# Patient Record
Sex: MALE | URBAN - METROPOLITAN AREA
[De-identification: names, ages, dates, MRNs, and addresses within clinical notes are randomized per-mention and may not be internally consistent; named-entity substitution may affect disease eponyms.]

---

## 2021-02-23 ENCOUNTER — CLAIMS CREATED FROM THE CLAIM WINDOW (OUTPATIENT)
Dept: URBAN - METROPOLITAN AREA MEDICAL CENTER 43 | Facility: MEDICAL CENTER | Age: 86
End: 2021-02-23
Payer: MEDICARE

## 2021-02-23 DIAGNOSIS — R10.819 LOWER ABDOMINAL TENDERNESS: ICD-10-CM

## 2021-02-23 DIAGNOSIS — D50.8 ANEMIA, DUE TO INADEQUATE IRON INTAKE: ICD-10-CM

## 2021-02-23 DIAGNOSIS — R19.4 CHANGE IN BOWEL HABIT: ICD-10-CM

## 2021-02-23 DIAGNOSIS — I48.91 AFIB: ICD-10-CM

## 2021-02-23 PROCEDURE — 99223 1ST HOSP IP/OBS HIGH 75: CPT | Performed by: INTERNAL MEDICINE

## 2021-02-24 ENCOUNTER — CLAIMS CREATED FROM THE CLAIM WINDOW (OUTPATIENT)
Dept: URBAN - METROPOLITAN AREA MEDICAL CENTER 43 | Facility: MEDICAL CENTER | Age: 86
End: 2021-02-24
Payer: MEDICARE

## 2021-02-24 DIAGNOSIS — D50.8 OTHER IRON DEFICIENCY ANEMIAS: ICD-10-CM

## 2021-02-24 DIAGNOSIS — R19.4 CHANGE IN BOWEL HABIT: ICD-10-CM

## 2021-02-24 PROCEDURE — 99233 SBSQ HOSP IP/OBS HIGH 50: CPT | Performed by: INTERNAL MEDICINE

## 2021-02-25 ENCOUNTER — CLAIMS CREATED FROM THE CLAIM WINDOW (OUTPATIENT)
Dept: URBAN - METROPOLITAN AREA MEDICAL CENTER 43 | Facility: MEDICAL CENTER | Age: 86
End: 2021-02-25
Payer: MEDICARE

## 2021-02-25 DIAGNOSIS — R19.4 CHANGE IN BOWEL HABIT: ICD-10-CM

## 2021-02-25 DIAGNOSIS — D50.8 ANEMIA, DUE TO INADEQUATE IRON INTAKE: ICD-10-CM

## 2021-02-25 PROCEDURE — 99232 SBSQ HOSP IP/OBS MODERATE 35: CPT | Performed by: INTERNAL MEDICINE

## 2021-02-26 ENCOUNTER — CLAIMS CREATED FROM THE CLAIM WINDOW (OUTPATIENT)
Dept: URBAN - METROPOLITAN AREA MEDICAL CENTER 43 | Facility: MEDICAL CENTER | Age: 86
End: 2021-02-26
Payer: MEDICARE

## 2021-02-26 DIAGNOSIS — K55.20 ANGIODYSPLASIA OF COLON WITHOUT HEMORRHAGE: ICD-10-CM

## 2021-02-26 DIAGNOSIS — D50.9 IRON DEFICIENCY ANEMIA, UNSPECIFIED: ICD-10-CM

## 2021-02-26 DIAGNOSIS — D12.2 BENIGN NEOPLASM OF ASCENDING COLON: ICD-10-CM

## 2021-02-26 DIAGNOSIS — D12.0 BENIGN NEOPLASM OF CECUM: ICD-10-CM

## 2021-02-26 DIAGNOSIS — R19.4 CHANGE IN BOWEL HABIT: ICD-10-CM

## 2021-02-26 DIAGNOSIS — K22.8 OTHER SPECIFIED DISEASES OF ESOPHAGUS: ICD-10-CM

## 2021-02-26 DIAGNOSIS — K29.50 ANTRAL GASTRITIS: ICD-10-CM

## 2021-02-26 DIAGNOSIS — K31.89 DILATION OF STOMACH: ICD-10-CM

## 2021-02-26 PROCEDURE — 45385 COLONOSCOPY W/LESION REMOVAL: CPT | Performed by: INTERNAL MEDICINE

## 2021-02-26 PROCEDURE — 43239 EGD BIOPSY SINGLE/MULTIPLE: CPT | Performed by: INTERNAL MEDICINE

## 2021-02-26 PROCEDURE — 45388 COLONOSCOPY W/ABLATION: CPT | Performed by: INTERNAL MEDICINE

## 2021-02-27 ENCOUNTER — CLAIMS CREATED FROM THE CLAIM WINDOW (OUTPATIENT)
Dept: URBAN - METROPOLITAN AREA MEDICAL CENTER 43 | Facility: MEDICAL CENTER | Age: 86
End: 2021-02-27
Payer: MEDICARE

## 2021-02-27 DIAGNOSIS — D12.0 BENIGN NEOPLASM OF CECUM: ICD-10-CM

## 2021-02-27 DIAGNOSIS — D12.2 BENIGN NEOPLASM OF ASCENDING COLON: ICD-10-CM

## 2021-02-27 DIAGNOSIS — D50.8 ANEMIA, DUE TO INADEQUATE IRON INTAKE: ICD-10-CM

## 2021-02-27 DIAGNOSIS — R19.4 CHANGE IN BOWEL HABIT: ICD-10-CM

## 2021-02-27 PROCEDURE — 99232 SBSQ HOSP IP/OBS MODERATE 35: CPT | Performed by: INTERNAL MEDICINE

## 2021-03-25 ENCOUNTER — OFFICE VISIT (OUTPATIENT)
Dept: URBAN - METROPOLITAN AREA CLINIC 113 | Facility: CLINIC | Age: 86
End: 2021-03-25

## 2021-04-23 ENCOUNTER — OFFICE VISIT (OUTPATIENT)
Dept: URBAN - METROPOLITAN AREA CLINIC 113 | Facility: CLINIC | Age: 86
End: 2021-04-23
Payer: MEDICARE

## 2021-04-23 ENCOUNTER — WEB ENCOUNTER (OUTPATIENT)
Dept: URBAN - METROPOLITAN AREA CLINIC 113 | Facility: CLINIC | Age: 86
End: 2021-04-23

## 2021-04-23 VITALS
HEIGHT: 74 IN | TEMPERATURE: 97.8 F | BODY MASS INDEX: 26.56 KG/M2 | HEART RATE: 54 BPM | WEIGHT: 207 LBS | SYSTOLIC BLOOD PRESSURE: 133 MMHG | DIASTOLIC BLOOD PRESSURE: 61 MMHG

## 2021-04-23 DIAGNOSIS — R14.3 EXCESSIVE GAS: ICD-10-CM

## 2021-04-23 DIAGNOSIS — K55.20 ARTERIOVENOUS MALFORMATION OF COLON: ICD-10-CM

## 2021-04-23 DIAGNOSIS — D50.9 IRON DEFICIENCY ANEMIA: ICD-10-CM

## 2021-04-23 PROBLEM — 87522002 IRON DEFICIENCY ANEMIA: Status: ACTIVE | Noted: 2021-04-23

## 2021-04-23 PROCEDURE — 99213 OFFICE O/P EST LOW 20 MIN: CPT | Performed by: NURSE PRACTITIONER

## 2021-04-23 RX ORDER — FUROSEMIDE 40 MG/1
1 TABLET PRN TABLET ORAL
Status: ACTIVE | COMMUNITY

## 2021-04-23 RX ORDER — TEMAZEPAM 30 MG/1
1 CAPSULE AT BEDTIME AS NEEDED CAPSULE ORAL ONCE A DAY
Status: ACTIVE | COMMUNITY

## 2021-04-23 RX ORDER — LOSARTAN POTASSIUM 25 MG/1
1 TABLET TABLET ORAL ONCE A DAY
Status: ACTIVE | COMMUNITY

## 2021-04-23 RX ORDER — SODIUM BICARBONATE TAB 650 MG 650 MG
1 TABLET TAB ORAL ONCE DAILY
Status: ACTIVE | COMMUNITY

## 2021-04-23 RX ORDER — HYDRALAZINE HYDROCHLORIDE 50 MG/1
1 TABLET TABLET, FILM COATED ORAL THREE TIMES A DAY
Status: ACTIVE | COMMUNITY

## 2021-04-23 RX ORDER — APIXABAN 2.5 MG/1
1 TABLET TABLET, FILM COATED ORAL TWICE DAILY
Status: ACTIVE | COMMUNITY

## 2021-04-23 RX ORDER — ROSUVASTATIN CALCIUM 10 MG/1
1 TABLET TABLET, FILM COATED ORAL ONCE A DAY
Status: ACTIVE | COMMUNITY

## 2021-04-23 RX ORDER — TAMSULOSIN HYDROCHLORIDE 0.4 MG/1
1 CAPSULE CAPSULE ORAL ONCE A DAY
Status: ACTIVE | COMMUNITY

## 2021-04-23 RX ORDER — ROPINIROLE 2 MG/1
1 TABLET TABLET, FILM COATED ORAL ONCE A DAY
Status: ACTIVE | COMMUNITY

## 2021-04-23 NOTE — HPI-TODAY'S VISIT:
88-year-old gentleman with history of atrial fibrillation on Eliquis, ischemic cardiomyopathy, congestive heart failure, atrial fibrillation on Eliquis, prior CABG, chronic kidney disease, presenting for follow-up after hospitalization. He was seen in hospital consultation on 2/22/2021 by Dr. Rosario for evaluation of symptomatic anemia iron deficiency, following presentation for fatigue and weakness with an admission hemoglobin of 6.5.  He was transfused with 2 units of packed red blood cells and placed on pantoprazole.  An EGD and colonoscopy were recommended, but the patient initially deferred endoscopic assessment.  Regarding his diarrhea predominant change in bowel habits, he was recommended a trial of WelChol and a lactose-free diet. Subsequent colonoscopy on 2/26/2021 was notable for a single nonbleeding 1 cm cecal angiectasia treated with argon plasma coagulation (APC) which was the suspected source of occult GI bleeding.  Also notable for a single nonbleeding colonic angiectasia status post APC, diverticulosis in the sigmoid colon, four 4 to 5 mm polyps in the ascending colon and cecum, significant colonic spasm, and grade 1 internal hemorrhoids.  Polypectomy pathology showed tubular adenomas. EGD on 2/26/2021 was notable for an irregular Z-line and nonerosive gastropathy.  Gastric biopsy showed mild chronic gastritis, negative for H. pylori.  Duodenal biopsies were negative for sprue.  His fatigue and weakness have resolved.  He denies abdominal pain, nausea, or vomiting.  He has a daily bowel movement without blood per rectum.  He denies difficulty with diarrhea, and never began the WelChol.  Within the last 4 to 6 months, he has experienced increasing difficulty with excessive gas and occasional abdominal bloating, particularly following meals.  At times, he will experience the urge to have a bowel movement and will just pass flatus.  He reduces consul from 1 tablespoon 3 times daily to twice daily, with minimal improvement in his gas.  He has been taking his fiber supplementation for about 3 years. He reports recent labs at [facility] showed normal blood counts and iron studies. He is scheduled to undergo Watchman procedure next month. His son accompanies him and assists with the history.

## 2021-08-04 ENCOUNTER — OFFICE VISIT (OUTPATIENT)
Dept: URBAN - METROPOLITAN AREA CLINIC 113 | Facility: CLINIC | Age: 86
End: 2021-08-04
Payer: MEDICARE

## 2021-08-04 ENCOUNTER — WEB ENCOUNTER (OUTPATIENT)
Dept: URBAN - METROPOLITAN AREA CLINIC 113 | Facility: CLINIC | Age: 86
End: 2021-08-04

## 2021-08-04 VITALS
WEIGHT: 214 LBS | SYSTOLIC BLOOD PRESSURE: 119 MMHG | DIASTOLIC BLOOD PRESSURE: 56 MMHG | TEMPERATURE: 97.8 F | HEIGHT: 74 IN | HEART RATE: 52 BPM | BODY MASS INDEX: 27.46 KG/M2

## 2021-08-04 DIAGNOSIS — R14.3 EXCESSIVE GAS: ICD-10-CM

## 2021-08-04 DIAGNOSIS — K21.9 GASTROESOPHAGEAL REFLUX DISEASE, UNSPECIFIED WHETHER ESOPHAGITIS PRESENT: ICD-10-CM

## 2021-08-04 DIAGNOSIS — K59.01 SLOW TRANSIT CONSTIPATION: ICD-10-CM

## 2021-08-04 PROCEDURE — 99213 OFFICE O/P EST LOW 20 MIN: CPT | Performed by: INTERNAL MEDICINE

## 2021-08-04 RX ORDER — TEMAZEPAM 30 MG/1
1 CAPSULE AT BEDTIME AS NEEDED CAPSULE ORAL ONCE A DAY
Status: ACTIVE | COMMUNITY

## 2021-08-04 RX ORDER — CLOPIDOGREL BISULFATE 75 MG/1
1 TABLET TABLET ORAL ONCE A DAY
Status: ACTIVE | COMMUNITY

## 2021-08-04 RX ORDER — ROPINIROLE 2 MG/1
1 TABLET TABLET, FILM COATED ORAL ONCE A DAY
Status: ACTIVE | COMMUNITY

## 2021-08-04 RX ORDER — ASPIRIN 81 MG/1
1 TABLET TABLET ORAL ONCE A DAY
Status: ACTIVE | COMMUNITY

## 2021-08-04 RX ORDER — TAMSULOSIN HYDROCHLORIDE 0.4 MG/1
1 CAPSULE CAPSULE ORAL ONCE A DAY
Status: ACTIVE | COMMUNITY

## 2021-08-04 RX ORDER — ROSUVASTATIN CALCIUM 10 MG/1
1 TABLET TABLET, FILM COATED ORAL ONCE A DAY
Status: ACTIVE | COMMUNITY

## 2021-08-04 RX ORDER — LOSARTAN POTASSIUM 25 MG/1
1 TABLET TABLET ORAL ONCE A DAY
Status: ACTIVE | COMMUNITY

## 2021-08-04 RX ORDER — APIXABAN 2.5 MG/1
1 TABLET TABLET, FILM COATED ORAL TWICE DAILY
Status: ON HOLD | COMMUNITY

## 2021-08-04 RX ORDER — HYDRALAZINE HYDROCHLORIDE 50 MG/1
1 TABLET TABLET, FILM COATED ORAL THREE TIMES A DAY
Status: ACTIVE | COMMUNITY

## 2021-08-04 RX ORDER — FUROSEMIDE 40 MG/1
1 TABLET PRN TABLET ORAL
Status: ACTIVE | COMMUNITY

## 2021-08-04 RX ORDER — SODIUM BICARBONATE TAB 650 MG 650 MG
1 TABLET TAB ORAL ONCE DAILY
Status: ACTIVE | COMMUNITY

## 2021-08-04 NOTE — HPI-TODAY'S VISIT:
Mr. Parker is a 89-year-old gentleman with history of atrial fibrillation on Eliquis, ischemic cardiomyopathy, congestive heart failure, atrial fibrillation on Eliquis, prior CABG, chronic kidney disease, presenting for follow-up regarding constipation and GERD. He was last seen on 4/23/2021 for follow-up after hospitalization for iron deficiency anemia status post EGD and colonoscopy notable for ablation of colon AVMs.  He also reported having excessive gas.  He was recommended to trial a low FODMAP diet, use simethicone, and continue consult and add MiraLAX for suspected constipation. His most recent labs from 5/6/2021 show sodium 129, potassium 3.9, chloride 95, CO2 25.1, BUN 35, creatinine 1.3, glucose 87, calcium 8.9; WBC 6.79, hemoglobin 11.1, MCV 82.3, platelets 128, INR 1.2, PT 15.6. He reports for the past 6 months having issues of increased gas/bloating, inconsistency in his bowel habits with looser bowel movements exacerbated by some meals and periods of no bowel movement.  He describes having "blowout gas".  No melena or red blood per rectum.  Is not have any significant abdominal pain, no nocturnal bowel movements and denies any fevers or weight loss.  His heartburn symptoms are well controlled on famotidine 40 mg daily.  No dysphagia.

## 2021-08-04 NOTE — HPI-OTHER HISTORIES
Colonoscopy (2/26/2021) single nonbleeding 1 cm cecal angiectasia treated with argon plasma coagulation (APC) which was the suspected source of occult GI bleeding.  Also notable for a single nonbleeding colonic angiectasia status post APC, diverticulosis in the sigmoid colon, removal of four 4 to 5 mm tubular adenomas in the ascending colon and cecum, significant colonic spasm, and grade 1 internal hemorrhoids.    EGD on 2/26/2021 was notable for an irregular Z-line and nonerosive gastropathy.  Gastric biopsy showed mild chronic gastritis, negative for H. pylori.  Duodenal biopsies were negative for sprue.

## 2021-08-16 ENCOUNTER — TELEPHONE ENCOUNTER (OUTPATIENT)
Dept: URBAN - METROPOLITAN AREA CLINIC 113 | Facility: CLINIC | Age: 86
End: 2021-08-16

## 2021-08-16 RX ORDER — LINACLOTIDE 145 UG/1
1 CAPSULE AT LEAST 30 MINUTES BEFORE THE FIRST MEAL OF THE DAY ON AN EMPTY STOMACH CAPSULE, GELATIN COATED ORAL ONCE A DAY
Qty: 90 | Refills: 3 | OUTPATIENT

## 2021-08-16 RX ORDER — SODIUM BICARBONATE TAB 650 MG 650 MG
1 TABLET TAB ORAL ONCE DAILY
Status: ACTIVE | COMMUNITY

## 2021-08-16 RX ORDER — APIXABAN 2.5 MG/1
1 TABLET TABLET, FILM COATED ORAL TWICE DAILY
Status: ON HOLD | COMMUNITY

## 2021-08-16 RX ORDER — ASPIRIN 81 MG/1
1 TABLET TABLET ORAL ONCE A DAY
Status: ACTIVE | COMMUNITY

## 2021-08-16 RX ORDER — ROSUVASTATIN CALCIUM 10 MG/1
1 TABLET TABLET, FILM COATED ORAL ONCE A DAY
Status: ACTIVE | COMMUNITY

## 2021-08-16 RX ORDER — TEMAZEPAM 30 MG/1
1 CAPSULE AT BEDTIME AS NEEDED CAPSULE ORAL ONCE A DAY
Status: ACTIVE | COMMUNITY

## 2021-08-16 RX ORDER — HYDRALAZINE HYDROCHLORIDE 50 MG/1
1 TABLET TABLET, FILM COATED ORAL THREE TIMES A DAY
Status: ACTIVE | COMMUNITY

## 2021-08-16 RX ORDER — FUROSEMIDE 40 MG/1
1 TABLET PRN TABLET ORAL
Status: ACTIVE | COMMUNITY

## 2021-08-16 RX ORDER — LOSARTAN POTASSIUM 25 MG/1
1 TABLET TABLET ORAL ONCE A DAY
Status: ACTIVE | COMMUNITY

## 2021-08-16 RX ORDER — ROPINIROLE 2 MG/1
1 TABLET TABLET, FILM COATED ORAL ONCE A DAY
Status: ACTIVE | COMMUNITY

## 2021-08-16 RX ORDER — TAMSULOSIN HYDROCHLORIDE 0.4 MG/1
1 CAPSULE CAPSULE ORAL ONCE A DAY
Status: ACTIVE | COMMUNITY

## 2021-08-16 RX ORDER — CLOPIDOGREL BISULFATE 75 MG/1
1 TABLET TABLET ORAL ONCE A DAY
Status: ACTIVE | COMMUNITY

## 2022-01-20 ENCOUNTER — TELEPHONE ENCOUNTER (OUTPATIENT)
Dept: URBAN - METROPOLITAN AREA CLINIC 113 | Facility: CLINIC | Age: 87
End: 2022-01-20

## 2022-01-20 RX ORDER — LINACLOTIDE 145 UG/1
1 CAPSULE AT LEAST 30 MINUTES BEFORE THE FIRST MEAL OF THE DAY ON AN EMPTY STOMACH CAPSULE, GELATIN COATED ORAL ONCE A DAY
Qty: 90 | Refills: 3
End: 2023-01-15

## 2022-04-13 ENCOUNTER — OFFICE VISIT (OUTPATIENT)
Dept: URBAN - METROPOLITAN AREA CLINIC 113 | Facility: CLINIC | Age: 87
End: 2022-04-13
Payer: MEDICARE

## 2022-04-13 VITALS
TEMPERATURE: 97.3 F | RESPIRATION RATE: 20 BRPM | SYSTOLIC BLOOD PRESSURE: 121 MMHG | BODY MASS INDEX: 26.31 KG/M2 | HEIGHT: 74 IN | HEART RATE: 62 BPM | WEIGHT: 205 LBS | DIASTOLIC BLOOD PRESSURE: 65 MMHG

## 2022-04-13 DIAGNOSIS — R14.3 EXCESSIVE GAS: ICD-10-CM

## 2022-04-13 DIAGNOSIS — K59.01 SLOW TRANSIT CONSTIPATION: ICD-10-CM

## 2022-04-13 PROCEDURE — 99213 OFFICE O/P EST LOW 20 MIN: CPT | Performed by: INTERNAL MEDICINE

## 2022-04-13 RX ORDER — TEMAZEPAM 30 MG/1
1 CAPSULE AT BEDTIME AS NEEDED CAPSULE ORAL ONCE A DAY
Status: ACTIVE | COMMUNITY

## 2022-04-13 RX ORDER — HYDRALAZINE HYDROCHLORIDE 50 MG/1
1 TABLET TABLET, FILM COATED ORAL THREE TIMES A DAY
Status: ACTIVE | COMMUNITY

## 2022-04-13 RX ORDER — APIXABAN 2.5 MG/1
1 TABLET TABLET, FILM COATED ORAL TWICE DAILY
Status: ON HOLD | COMMUNITY

## 2022-04-13 RX ORDER — LINACLOTIDE 72 UG/1
1 CAPSULE AT LEAST 30 MINUTES BEFORE THE FIRST MEAL OF THE DAY ON AN EMPTY STOMACH CAPSULE, GELATIN COATED ORAL ONCE A DAY
OUTPATIENT

## 2022-04-13 RX ORDER — UBIDECARENONE 30 MG
AS DIRECTED CAPSULE ORAL
Status: ACTIVE | COMMUNITY

## 2022-04-13 RX ORDER — ROSUVASTATIN CALCIUM 10 MG/1
1 TABLET TABLET, FILM COATED ORAL ONCE A DAY
Status: ACTIVE | COMMUNITY

## 2022-04-13 RX ORDER — NICOTINE 14MG/24HR
1 CAPSULE PATCH, TRANSDERMAL 24 HOURS TRANSDERMAL TWICE A DAY
OUTPATIENT

## 2022-04-13 RX ORDER — CLOPIDOGREL BISULFATE 75 MG/1
1 TABLET TABLET ORAL ONCE A DAY
Status: ON HOLD | COMMUNITY

## 2022-04-13 RX ORDER — FERROUS SULFATE 324(65)MG
1 TABLET TABLET, DELAYED RELEASE (ENTERIC COATED) ORAL ONCE A DAY
Status: ACTIVE | COMMUNITY

## 2022-04-13 RX ORDER — ASPIRIN 81 MG/1
1 TABLET TABLET ORAL ONCE A DAY
Status: ACTIVE | COMMUNITY

## 2022-04-13 RX ORDER — NICOTINE 14MG/24HR
1 CAPSULE PATCH, TRANSDERMAL 24 HOURS TRANSDERMAL TWICE A DAY
Status: ACTIVE | COMMUNITY

## 2022-04-13 RX ORDER — TAMSULOSIN HYDROCHLORIDE 0.4 MG/1
1 CAPSULE CAPSULE ORAL ONCE A DAY
Status: ACTIVE | COMMUNITY

## 2022-04-13 RX ORDER — FUROSEMIDE 40 MG/1
1 TABLET PRN TABLET ORAL
Status: ACTIVE | COMMUNITY

## 2022-04-13 RX ORDER — LOSARTAN POTASSIUM 25 MG/1
1 TABLET TABLET ORAL ONCE A DAY
Status: ACTIVE | COMMUNITY

## 2022-04-13 RX ORDER — LINACLOTIDE 145 UG/1
1 CAPSULE AT LEAST 30 MINUTES BEFORE THE FIRST MEAL OF THE DAY ON AN EMPTY STOMACH CAPSULE, GELATIN COATED ORAL ONCE A DAY
Qty: 90 | Refills: 3 | Status: ACTIVE | COMMUNITY
End: 2023-01-15

## 2022-04-13 RX ORDER — ROPINIROLE 2 MG/1
1 TABLET TABLET, FILM COATED ORAL ONCE A DAY
Status: ACTIVE | COMMUNITY

## 2022-04-13 RX ORDER — SODIUM BICARBONATE TAB 650 MG 650 MG
1 TABLET TAB ORAL ONCE DAILY
Status: ACTIVE | COMMUNITY

## 2022-04-13 NOTE — HPI-TODAY'S VISIT:
Mr. Parker is a 89-year-old gentleman with history of atrial fibrillation on Eliquis, ischemic cardiomyopathy, congestive heart failure, atrial fibrillation on Eliquis, prior CABG, chronic kidney disease, presenting for follow-up regarding constipation and GERD. He was last seen on 4/23/2021 for follow-up after hospitalization for iron deficiency anemia status post EGD and colonoscopy notable for ablation of colon AVMs.  He also reported having excessive gas.  He was recommended to trial a low FODMAP diet, use simethicone, and continue consult and add MiraLAX for suspected constipation. His most recent labs from 5/6/2021 show sodium 129, potassium 3.9, chloride 95, CO2 25.1, BUN 35, creatinine 1.3, glucose 87, calcium 8.9; WBC 6.79, hemoglobin 11.1, MCV 82.3, platelets 128, INR 1.2, PT 15.6. He reports for the past 6 months having issues of increased gas/bloating, inconsistency in his bowel habits with looser bowel movements exacerbated by some meals and periods of no bowel movement.  He describes having "blowout gas".  No melena or red blood per rectum.  Is not have any significant abdominal pain, no nocturnal bowel movements and denies any fevers or weight loss.  His heartburn symptoms are well controlled on famotidine 40 mg daily.  No dysphagia. He was last seen on 8/4/2021 for follow-up regarding slow transit constipation, excessive gas and GERD.  Is recommended to trial Linzess 145 mcg daily, use simethicone/Gas-X for relief of exacerbations of gas and bloating, and resume famotidine 20 mg daily for relief of GERD symptoms.  Her follow-up telephone call on 8/16/2021, he reported the Linzess 145 mcg daily was helpful.  A prescription was provided. Labs on 1/13/22 show WBC 5.91, hemoglobin 11.1, MCV 90.6, platelets 178, normal basic metabolic panel. Since his last appointment he developed a staph infection in his right knee and has been on antibiotics for 2 months.  He has been on the antibiotics he has developed a change in his bowel habits described as watery and now slowly becoming more formed.  He continues to take Linzess 145 mcg daily, a probiotic daily and Konsyl fiber daily.  He denies any abdominal pain or red blood per rectum.

## 2022-04-13 NOTE — HPI-OTHER HISTORIES
Colonoscopy (2/26/2021) single nonbleeding 1 cm cecal angiectasia treated with argon plasma coagulation (APC) which was the suspected source of occult GI bleeding, a single nonbleeding ascending colon angiectasia status post APC, diverticulosis in the sigmoid colon, removal of four 4 to 5 mm tubular adenomas in the ascending colon and cecum, significant colonic spasm, and grade 1 internal hemorrhoids.    EGD (2/26/2021): notable for an irregular Z-line and nonerosive gastropathy.  Gastric biopsy showed mild chronic gastritis, negative for H. pylori.  Duodenal biopsies were negative for sprue.

## 2022-04-28 ENCOUNTER — TELEPHONE ENCOUNTER (OUTPATIENT)
Dept: URBAN - METROPOLITAN AREA CLINIC 113 | Facility: CLINIC | Age: 87
End: 2022-04-28

## 2022-04-28 PROBLEM — 80301007: Status: ACTIVE | Noted: 2021-08-04

## 2022-05-02 ENCOUNTER — TELEPHONE ENCOUNTER (OUTPATIENT)
Dept: URBAN - METROPOLITAN AREA CLINIC 113 | Facility: CLINIC | Age: 87
End: 2022-05-02

## 2022-05-02 RX ORDER — LINACLOTIDE 72 UG/1
1 CAPSULE AT LEAST 30 MINUTES BEFORE THE FIRST MEAL OF THE DAY ON AN EMPTY STOMACH CAPSULE, GELATIN COATED ORAL ONCE A DAY
Qty: 30 | Refills: 4

## 2022-09-09 ENCOUNTER — OFFICE VISIT (OUTPATIENT)
Dept: URBAN - METROPOLITAN AREA CLINIC 113 | Facility: CLINIC | Age: 87
End: 2022-09-09
Payer: MEDICARE

## 2022-09-09 VITALS
TEMPERATURE: 97.5 F | HEART RATE: 53 BPM | DIASTOLIC BLOOD PRESSURE: 62 MMHG | SYSTOLIC BLOOD PRESSURE: 118 MMHG | WEIGHT: 205 LBS | HEIGHT: 74 IN | BODY MASS INDEX: 26.31 KG/M2

## 2022-09-09 DIAGNOSIS — K21.9 GERD: ICD-10-CM

## 2022-09-09 DIAGNOSIS — K59.01 SLOW TRANSIT CONSTIPATION: ICD-10-CM

## 2022-09-09 PROCEDURE — 99214 OFFICE O/P EST MOD 30 MIN: CPT | Performed by: NURSE PRACTITIONER

## 2022-09-09 RX ORDER — FERROUS SULFATE 324(65)MG
1 TABLET TABLET, DELAYED RELEASE (ENTERIC COATED) ORAL ONCE A DAY
Status: ACTIVE | COMMUNITY

## 2022-09-09 RX ORDER — ASPIRIN 81 MG/1
1 TABLET TABLET ORAL ONCE A DAY
Status: ACTIVE | COMMUNITY

## 2022-09-09 RX ORDER — FAMOTIDINE 40 MG/1
1 TABLET AT BEDTIME TABLET, FILM COATED ORAL ONCE A DAY
Status: ACTIVE | COMMUNITY

## 2022-09-09 RX ORDER — TAMSULOSIN HYDROCHLORIDE 0.4 MG/1
1 CAPSULE CAPSULE ORAL ONCE A DAY
Status: ACTIVE | COMMUNITY

## 2022-09-09 RX ORDER — HYDRALAZINE HYDROCHLORIDE 50 MG/1
1 TABLET TABLET, FILM COATED ORAL THREE TIMES A DAY
Status: ACTIVE | COMMUNITY

## 2022-09-09 RX ORDER — NICOTINE 14MG/24HR
1 CAPSULE PATCH, TRANSDERMAL 24 HOURS TRANSDERMAL TWICE A DAY
Status: ON HOLD | COMMUNITY

## 2022-09-09 RX ORDER — UBIDECARENONE 30 MG
AS DIRECTED CAPSULE ORAL
Status: ACTIVE | COMMUNITY

## 2022-09-09 RX ORDER — MIRTAZAPINE 30 MG/1
1 TABLET ON THE TONGUE AND ALLOW TO DISSOLVE AT BEDTIME TABLET, ORALLY DISINTEGRATING ORAL ONCE A DAY
Status: ACTIVE | COMMUNITY

## 2022-09-09 RX ORDER — FUROSEMIDE 40 MG/1
1 TABLET PRN TABLET ORAL
Status: ACTIVE | COMMUNITY

## 2022-09-09 RX ORDER — DEXLANSOPRAZOLE 60 MG/1
1 CAPSULE CAPSULE, DELAYED RELEASE ORAL ONCE A DAY
Status: ACTIVE | COMMUNITY

## 2022-09-09 RX ORDER — LINACLOTIDE 72 UG/1
1 CAPSULE AT LEAST 30 MINUTES BEFORE THE FIRST MEAL OF THE DAY ON AN EMPTY STOMACH CAPSULE, GELATIN COATED ORAL ONCE A DAY
Qty: 30 | Refills: 4 | Status: ACTIVE | COMMUNITY

## 2022-09-09 RX ORDER — TEMAZEPAM 30 MG/1
1 CAPSULE AT BEDTIME AS NEEDED CAPSULE ORAL ONCE A DAY
Status: ACTIVE | COMMUNITY

## 2022-09-09 RX ORDER — LOSARTAN POTASSIUM 25 MG/1
1 TABLET TABLET ORAL ONCE A DAY
Status: ACTIVE | COMMUNITY

## 2022-09-09 RX ORDER — APIXABAN 2.5 MG/1
1 TABLET TABLET, FILM COATED ORAL TWICE DAILY
Status: ON HOLD | COMMUNITY

## 2022-09-09 RX ORDER — ROSUVASTATIN CALCIUM 10 MG/1
1 TABLET TABLET, FILM COATED ORAL ONCE A DAY
Status: ACTIVE | COMMUNITY

## 2022-09-09 RX ORDER — PLECANATIDE 3 MG/1
1 TABLET TABLET ORAL ONCE A DAY
Qty: 90 TABLET | Refills: 3 | OUTPATIENT
Start: 2022-09-09 | End: 2023-09-04

## 2022-09-09 RX ORDER — SODIUM BICARBONATE TAB 650 MG 650 MG
1 TABLET TAB ORAL ONCE DAILY
Status: ACTIVE | COMMUNITY

## 2022-09-09 RX ORDER — CLOPIDOGREL BISULFATE 75 MG/1
1 TABLET TABLET ORAL ONCE A DAY
Status: ON HOLD | COMMUNITY

## 2022-09-09 RX ORDER — ROPINIROLE 2 MG/1
1 TABLET TABLET, FILM COATED ORAL ONCE A DAY
Status: ACTIVE | COMMUNITY

## 2022-09-09 NOTE — HPI-TODAY'S VISIT:
Mr. Parker is a 89-year-old gentleman with history of atrial fibrillation on Eliquis, ischemic cardiomyopathy, congestive heart failure, atrial fibrillation on Eliquis, prior CABG, chronic kidney disease, presenting for follow-up. He was seen in the office 4/13/2022 for follow-up regarding a change in bowel habits status post prolonged antibiotics for treatment of a septic right knee.  He was to decrease Linzess to 72 mcg daily and continue Konsyl fiber.  His excess gas had responded to a probiotic. He has continued to experience inconsistent response to Linzess. He will go 1-2 days without a bowel movement, prompting use of milk of magnesia, or will experience several episodes of diarrhea. He denies abdominal pain, nausea or blood in the stool. He remains on Metamucil 1 tsp daily. Within the last month, he has experienced a significant bout of reflux symptoms (heartburn, indigestion, nocturnal cough and gagging), which seemed to occur after being off of his famotidine. He was started back on famotidine by Dr. Ortiz and initiated on Dexilant by Dr. Cheatham. His symptoms have resolved within the last week since being on acid suppression.

## 2022-09-12 ENCOUNTER — TELEPHONE ENCOUNTER (OUTPATIENT)
Dept: URBAN - METROPOLITAN AREA CLINIC 113 | Facility: CLINIC | Age: 87
End: 2022-09-12

## 2022-09-20 ENCOUNTER — TELEPHONE ENCOUNTER (OUTPATIENT)
Dept: URBAN - METROPOLITAN AREA CLINIC 113 | Facility: CLINIC | Age: 87
End: 2022-09-20

## 2022-09-23 ENCOUNTER — TELEPHONE ENCOUNTER (OUTPATIENT)
Dept: URBAN - METROPOLITAN AREA CLINIC 113 | Facility: CLINIC | Age: 87
End: 2022-09-23

## 2022-09-29 ENCOUNTER — TELEPHONE ENCOUNTER (OUTPATIENT)
Dept: URBAN - METROPOLITAN AREA CLINIC 113 | Facility: CLINIC | Age: 87
End: 2022-09-29

## 2022-09-29 RX ORDER — PLECANATIDE 3 MG/1
1 TABLET TABLET ORAL ONCE A DAY
Qty: 90 | Refills: 3
Start: 2022-09-09 | End: 2023-09-24

## 2022-10-11 ENCOUNTER — TELEPHONE ENCOUNTER (OUTPATIENT)
Dept: URBAN - METROPOLITAN AREA CLINIC 113 | Facility: CLINIC | Age: 87
End: 2022-10-11

## 2022-10-11 RX ORDER — NICOTINE 14MG/24HR
1 CAPSULE PATCH, TRANSDERMAL 24 HOURS TRANSDERMAL TWICE A DAY
Status: ON HOLD | COMMUNITY

## 2022-10-11 RX ORDER — LOSARTAN POTASSIUM 25 MG/1
1 TABLET TABLET ORAL ONCE A DAY
Status: ACTIVE | COMMUNITY

## 2022-10-11 RX ORDER — FERROUS SULFATE 324(65)MG
1 TABLET TABLET, DELAYED RELEASE (ENTERIC COATED) ORAL ONCE A DAY
Status: ACTIVE | COMMUNITY

## 2022-10-11 RX ORDER — UBIDECARENONE 30 MG
AS DIRECTED CAPSULE ORAL
Status: ACTIVE | COMMUNITY

## 2022-10-11 RX ORDER — POLYETHYLENE GLYCOL 3350, SODIUM CHLORIDE, SODIUM BICARBONATE, POTASSIUM CHLORIDE 420; 11.2; 5.72; 1.48 G/4L; G/4L; G/4L; G/4L
AS DIRECTED POWDER, FOR SOLUTION ORAL
Qty: 4000 ML | Refills: 0 | OUTPATIENT
Start: 2022-10-13 | End: 2022-11-11

## 2022-10-11 RX ORDER — ROPINIROLE 2 MG/1
1 TABLET TABLET, FILM COATED ORAL ONCE A DAY
Status: ACTIVE | COMMUNITY

## 2022-10-11 RX ORDER — FAMOTIDINE 40 MG/1
1 TABLET AT BEDTIME TABLET, FILM COATED ORAL ONCE A DAY
Status: ACTIVE | COMMUNITY

## 2022-10-11 RX ORDER — APIXABAN 2.5 MG/1
1 TABLET TABLET, FILM COATED ORAL TWICE DAILY
Status: ON HOLD | COMMUNITY

## 2022-10-11 RX ORDER — ASPIRIN 81 MG/1
1 TABLET TABLET ORAL ONCE A DAY
Status: ACTIVE | COMMUNITY

## 2022-10-11 RX ORDER — TAMSULOSIN HYDROCHLORIDE 0.4 MG/1
1 CAPSULE CAPSULE ORAL ONCE A DAY
Status: ACTIVE | COMMUNITY

## 2022-10-11 RX ORDER — FUROSEMIDE 40 MG/1
1 TABLET PRN TABLET ORAL
Status: ACTIVE | COMMUNITY

## 2022-10-11 RX ORDER — PLECANATIDE 3 MG/1
1 TABLET TABLET ORAL ONCE A DAY
Qty: 90 | Refills: 3 | Status: ACTIVE | COMMUNITY
Start: 2022-09-09 | End: 2023-09-24

## 2022-10-11 RX ORDER — SODIUM BICARBONATE TAB 650 MG 650 MG
1 TABLET TAB ORAL ONCE DAILY
Status: ACTIVE | COMMUNITY

## 2022-10-11 RX ORDER — MIRTAZAPINE 30 MG/1
1 TABLET ON THE TONGUE AND ALLOW TO DISSOLVE AT BEDTIME TABLET, ORALLY DISINTEGRATING ORAL ONCE A DAY
Status: ACTIVE | COMMUNITY

## 2022-10-11 RX ORDER — HYDRALAZINE HYDROCHLORIDE 50 MG/1
1 TABLET TABLET, FILM COATED ORAL THREE TIMES A DAY
Status: ACTIVE | COMMUNITY

## 2022-10-11 RX ORDER — CLOPIDOGREL BISULFATE 75 MG/1
1 TABLET TABLET ORAL ONCE A DAY
Status: ON HOLD | COMMUNITY

## 2022-10-11 RX ORDER — TEMAZEPAM 30 MG/1
1 CAPSULE AT BEDTIME AS NEEDED CAPSULE ORAL ONCE A DAY
Status: ACTIVE | COMMUNITY

## 2022-10-11 RX ORDER — ROSUVASTATIN CALCIUM 10 MG/1
1 TABLET TABLET, FILM COATED ORAL ONCE A DAY
Status: ACTIVE | COMMUNITY

## 2022-10-11 RX ORDER — LINACLOTIDE 72 UG/1
1 CAPSULE AT LEAST 30 MINUTES BEFORE THE FIRST MEAL OF THE DAY ON AN EMPTY STOMACH CAPSULE, GELATIN COATED ORAL ONCE A DAY
Qty: 30 | Refills: 4 | Status: ACTIVE | COMMUNITY

## 2022-10-11 RX ORDER — LUBIPROSTONE 24 UG/1
1 CAPSULE WITH FOOD AND WATER CAPSULE, GELATIN COATED ORAL TWICE A DAY
Qty: 60 | Refills: 3 | OUTPATIENT
Start: 2022-10-13 | End: 2023-02-09

## 2022-10-11 RX ORDER — DEXLANSOPRAZOLE 60 MG/1
1 CAPSULE CAPSULE, DELAYED RELEASE ORAL ONCE A DAY
Status: ACTIVE | COMMUNITY

## 2022-11-04 ENCOUNTER — TELEPHONE ENCOUNTER (OUTPATIENT)
Dept: URBAN - METROPOLITAN AREA CLINIC 113 | Facility: CLINIC | Age: 87
End: 2022-11-04

## 2022-12-09 ENCOUNTER — OFFICE VISIT (OUTPATIENT)
Dept: URBAN - METROPOLITAN AREA CLINIC 113 | Facility: CLINIC | Age: 87
End: 2022-12-09
Payer: MEDICARE

## 2022-12-09 VITALS
RESPIRATION RATE: 20 BRPM | BODY MASS INDEX: 25.18 KG/M2 | DIASTOLIC BLOOD PRESSURE: 61 MMHG | TEMPERATURE: 97.8 F | HEIGHT: 74 IN | SYSTOLIC BLOOD PRESSURE: 120 MMHG | HEART RATE: 57 BPM | WEIGHT: 196.2 LBS

## 2022-12-09 DIAGNOSIS — K59.01 SLOW TRANSIT CONSTIPATION: ICD-10-CM

## 2022-12-09 DIAGNOSIS — K21.9 GASTROESOPHAGEAL REFLUX DISEASE, UNSPECIFIED WHETHER ESOPHAGITIS PRESENT: ICD-10-CM

## 2022-12-09 PROBLEM — 35298007: Status: ACTIVE | Noted: 2021-08-04

## 2022-12-09 PROCEDURE — 99214 OFFICE O/P EST MOD 30 MIN: CPT | Performed by: NURSE PRACTITIONER

## 2022-12-09 RX ORDER — CLOPIDOGREL BISULFATE 75 MG/1
1 TABLET TABLET ORAL ONCE A DAY
Status: ON HOLD | COMMUNITY

## 2022-12-09 RX ORDER — UBIDECARENONE 30 MG
AS DIRECTED CAPSULE ORAL
Status: ACTIVE | COMMUNITY

## 2022-12-09 RX ORDER — NICOTINE 14MG/24HR
1 CAPSULE PATCH, TRANSDERMAL 24 HOURS TRANSDERMAL TWICE A DAY
Status: ON HOLD | COMMUNITY

## 2022-12-09 RX ORDER — SODIUM BICARBONATE TAB 650 MG 650 MG
1 TABLET TAB ORAL TWICE A DAY
Status: ACTIVE | COMMUNITY

## 2022-12-09 RX ORDER — DEXLANSOPRAZOLE 60 MG/1
1 CAPSULE CAPSULE, DELAYED RELEASE ORAL ONCE A DAY
Status: ACTIVE | COMMUNITY

## 2022-12-09 RX ORDER — FUROSEMIDE 40 MG/1
1-2 TABLETS TABLET ORAL ONCE A DAY
Status: ACTIVE | COMMUNITY

## 2022-12-09 RX ORDER — FAMOTIDINE 40 MG/1
1 TABLET TABLET, FILM COATED ORAL TWICE A DAY
Status: ACTIVE | COMMUNITY

## 2022-12-09 RX ORDER — APIXABAN 2.5 MG/1
1 TABLET TABLET, FILM COATED ORAL TWICE DAILY
Status: ON HOLD | COMMUNITY

## 2022-12-09 RX ORDER — PLECANATIDE 3 MG/1
1 TABLET TABLET ORAL ONCE A DAY
Qty: 90 | Refills: 3 | Status: DISCONTINUED | COMMUNITY
Start: 2022-09-09 | End: 2023-09-24

## 2022-12-09 RX ORDER — HYDRALAZINE HYDROCHLORIDE 50 MG/1
1 TABLET TABLET, FILM COATED ORAL THREE TIMES A DAY
Status: ACTIVE | COMMUNITY

## 2022-12-09 RX ORDER — ASPIRIN 81 MG/1
1 TABLET TABLET ORAL ONCE A DAY
Status: ACTIVE | COMMUNITY

## 2022-12-09 RX ORDER — LINACLOTIDE 72 UG/1
1 CAPSULE AT LEAST 30 MINUTES BEFORE THE FIRST MEAL OF THE DAY ON AN EMPTY STOMACH CAPSULE, GELATIN COATED ORAL ONCE A DAY
Qty: 30 | Refills: 4 | Status: DISCONTINUED | COMMUNITY

## 2022-12-09 RX ORDER — TAMSULOSIN HYDROCHLORIDE 0.4 MG/1
1 CAPSULE CAPSULE ORAL ONCE A DAY
Status: ACTIVE | COMMUNITY

## 2022-12-09 RX ORDER — MIRTAZAPINE 30 MG/1
1 TABLET ON THE TONGUE AND ALLOW TO DISSOLVE AT BEDTIME TABLET, ORALLY DISINTEGRATING ORAL ONCE A DAY
Status: DISCONTINUED | COMMUNITY

## 2022-12-09 RX ORDER — FERROUS SULFATE 324(65)MG
1 TABLET TABLET, DELAYED RELEASE (ENTERIC COATED) ORAL ONCE A DAY
Status: DISCONTINUED | COMMUNITY

## 2022-12-09 RX ORDER — TEMAZEPAM 30 MG/1
1 CAPSULE AT BEDTIME AS NEEDED CAPSULE ORAL ONCE A DAY
Status: ACTIVE | COMMUNITY

## 2022-12-09 RX ORDER — ROPINIROLE 2 MG/1
1 TABLET TABLET, FILM COATED ORAL ONCE A DAY
Status: ACTIVE | COMMUNITY

## 2022-12-09 RX ORDER — LOSARTAN POTASSIUM 25 MG/1
1 TABLET TABLET ORAL ONCE A DAY
Status: ACTIVE | COMMUNITY

## 2022-12-09 RX ORDER — LUBIPROSTONE 24 UG/1
1 CAPSULE WITH FOOD AND WATER CAPSULE, GELATIN COATED ORAL TWICE A DAY
Qty: 60 | Refills: 3 | Status: ACTIVE | COMMUNITY
Start: 2022-10-13 | End: 2023-02-09

## 2022-12-09 RX ORDER — ROSUVASTATIN CALCIUM 10 MG/1
1 TABLET TABLET, FILM COATED ORAL ONCE A DAY
Status: ACTIVE | COMMUNITY

## 2022-12-09 NOTE — HPI-TODAY'S VISIT:
Mr. Parker is a 89-year-old gentleman with history of atrial fibrillation on Eliquis, ischemic cardiomyopathy, congestive heart failure, atrial fibrillation on Eliquis, prior CABG, chronic kidney disease, presenting for follow-up of constipation. He was seen in the office in September for routine follow-up regarding slow transit constipation, with incomplete response to Linzess. Previous trials of fiber supplementation, MiraLAX, and MOM had been ineffective. He was recommended a trial of Trulance with milk of magnesia as needed. Regarding GERD, his symptoms had responded to a regimen consisting of Dexilant and famotidine. Due to persistent constipation despite Trulance, he was recommended a bowel cleanse with Nulytely followed by a trial of Amitiza per a telephone encounter from 10/11. He continues to have difficulty managing his bowel habits, stating the stool is different every day. He has a daily bowel movement, but states the stools are often liquid. He did have normal, formed stools for 3 days last week. He is taking Amitiza 24mcg once in the morning and Konsyl 1 teaspoon daily. He has also tried stool softeners and a probiotic. He has not had a bowel movement so far today. He denies abdominal pain, bloating, nausea, vomiting, or blood in the stool. He does complain of a decrease in appetite and has lost about 10lb since the time of his last visit. He denies heartburn or regurgitation on Dexilant each morning and Pepcid twice daily, but does complain of excess phlegm upon waking in the morning and following most meals. This has responded to Mucinex in the past.

## 2022-12-16 ENCOUNTER — TELEPHONE ENCOUNTER (OUTPATIENT)
Dept: URBAN - METROPOLITAN AREA CLINIC 113 | Facility: CLINIC | Age: 87
End: 2022-12-16

## 2023-02-10 ENCOUNTER — TELEPHONE ENCOUNTER (OUTPATIENT)
Dept: URBAN - METROPOLITAN AREA CLINIC 113 | Facility: CLINIC | Age: 88
End: 2023-02-10

## 2023-02-28 ENCOUNTER — TELEPHONE ENCOUNTER (OUTPATIENT)
Dept: URBAN - METROPOLITAN AREA CLINIC 113 | Facility: CLINIC | Age: 88
End: 2023-02-28

## 2023-03-03 ENCOUNTER — TELEPHONE ENCOUNTER (OUTPATIENT)
Dept: URBAN - METROPOLITAN AREA CLINIC 113 | Facility: CLINIC | Age: 88
End: 2023-03-03

## 2023-03-09 ENCOUNTER — DASHBOARD ENCOUNTERS (OUTPATIENT)
Age: 88
End: 2023-03-09

## 2023-03-09 ENCOUNTER — OFFICE VISIT (OUTPATIENT)
Dept: URBAN - METROPOLITAN AREA CLINIC 113 | Facility: CLINIC | Age: 88
End: 2023-03-09
Payer: MEDICARE

## 2023-03-09 VITALS
SYSTOLIC BLOOD PRESSURE: 123 MMHG | WEIGHT: 184 LBS | RESPIRATION RATE: 16 BRPM | BODY MASS INDEX: 23.61 KG/M2 | HEIGHT: 74 IN | TEMPERATURE: 97.1 F | HEART RATE: 47 BPM | DIASTOLIC BLOOD PRESSURE: 48 MMHG

## 2023-03-09 DIAGNOSIS — R19.8 ALTERNATING CONSTIPATION AND DIARRHEA: ICD-10-CM

## 2023-03-09 DIAGNOSIS — K21.9 GERD WITHOUT ESOPHAGITIS: ICD-10-CM

## 2023-03-09 PROCEDURE — 99213 OFFICE O/P EST LOW 20 MIN: CPT | Performed by: INTERNAL MEDICINE

## 2023-03-09 RX ORDER — LUBIPROSTONE 8 UG/1
1 CAPSULE WITH FOOD AND WATER CAPSULE, GELATIN COATED ORAL TWICE A DAY
Status: ACTIVE | COMMUNITY

## 2023-03-09 RX ORDER — LUBIPROSTONE 8 UG/1
1 CAPSULE WITH FOOD AND WATER CAPSULE, GELATIN COATED ORAL TWICE A DAY
OUTPATIENT

## 2023-03-09 RX ORDER — DEXLANSOPRAZOLE 60 MG/1
1 CAPSULE CAPSULE, DELAYED RELEASE ORAL ONCE A DAY
OUTPATIENT

## 2023-03-09 RX ORDER — ROSUVASTATIN CALCIUM 10 MG/1
1 TABLET TABLET, FILM COATED ORAL ONCE A DAY
Status: ACTIVE | COMMUNITY

## 2023-03-09 RX ORDER — LOSARTAN POTASSIUM 25 MG/1
1 TABLET TABLET ORAL ONCE A DAY
Status: ACTIVE | COMMUNITY

## 2023-03-09 RX ORDER — ROPINIROLE 2 MG/1
1 TABLET TABLET, FILM COATED ORAL ONCE A DAY
Status: ACTIVE | COMMUNITY

## 2023-03-09 RX ORDER — FUROSEMIDE 40 MG/1
1-2 TABLETS TABLET ORAL ONCE A DAY
Status: ACTIVE | COMMUNITY

## 2023-03-09 RX ORDER — DEXLANSOPRAZOLE 60 MG/1
1 CAPSULE CAPSULE, DELAYED RELEASE ORAL ONCE A DAY
Status: ACTIVE | COMMUNITY

## 2023-03-09 RX ORDER — TEMAZEPAM 30 MG/1
1 CAPSULE AT BEDTIME AS NEEDED CAPSULE ORAL ONCE A DAY
Status: ACTIVE | COMMUNITY

## 2023-03-09 RX ORDER — CLOPIDOGREL BISULFATE 75 MG/1
1 TABLET TABLET ORAL ONCE A DAY
Status: ON HOLD | COMMUNITY

## 2023-03-09 RX ORDER — TAMSULOSIN HYDROCHLORIDE 0.4 MG/1
1 CAPSULE CAPSULE ORAL ONCE A DAY
Status: ACTIVE | COMMUNITY

## 2023-03-09 RX ORDER — SODIUM BICARBONATE TAB 650 MG 650 MG
1 TABLET TAB ORAL TWICE A DAY
Status: ACTIVE | COMMUNITY

## 2023-03-09 RX ORDER — ASPIRIN 81 MG/1
1 TABLET TABLET ORAL ONCE A DAY
Status: ACTIVE | COMMUNITY

## 2023-03-09 RX ORDER — APIXABAN 2.5 MG/1
1 TABLET TABLET, FILM COATED ORAL TWICE DAILY
Status: ON HOLD | COMMUNITY

## 2023-03-09 RX ORDER — METOLAZONE 5 MG/1
1 TABLET TABLET ORAL ONCE A DAY
Status: ACTIVE | COMMUNITY

## 2023-03-09 RX ORDER — NICOTINE 14MG/24HR
1 CAPSULE PATCH, TRANSDERMAL 24 HOURS TRANSDERMAL TWICE A DAY
Status: ON HOLD | COMMUNITY

## 2023-03-09 RX ORDER — HYDRALAZINE HYDROCHLORIDE 50 MG/1
1 TABLET TABLET, FILM COATED ORAL THREE TIMES A DAY
Status: ACTIVE | COMMUNITY

## 2023-03-09 RX ORDER — FAMOTIDINE 40 MG/1
1 TABLET TABLET, FILM COATED ORAL TWICE A DAY
Status: ACTIVE | COMMUNITY

## 2023-03-09 RX ORDER — FINASTERIDE 5 MG/1
1 TABLET TABLET, FILM COATED ORAL ONCE A DAY
Status: ACTIVE | COMMUNITY

## 2023-03-09 RX ORDER — UBIDECARENONE 30 MG
AS DIRECTED CAPSULE ORAL
Status: ACTIVE | COMMUNITY

## 2023-03-09 RX ORDER — FAMOTIDINE 40 MG/1
1 TABLET TABLET, FILM COATED ORAL TWICE A DAY
OUTPATIENT

## 2023-03-09 NOTE — HPI-TODAY'S VISIT:
Mr. Parker is a 89-year-old gentleman with history of atrial fibrillation, schemic cardiomyopathy, congestive heart failure, atrial fibrillation on Eliquis, prior CABG, chronic kidney disease, presenting for follow-up of constipation.  He was last seen on 12/9/2022 for follow-up regarding constipation and GERD.  He was recommended to use Amitiza 24 mcg once daily and increase soluble fiber supplement to 1 tablespoon daily.  His GERD symptoms were controlled on Dexilant 60 mg daily and famotidine 40 mg twice daily.  He returns for follow-up with continued issues of alternating constipation and diarrhea.  He has bowel movements that vary between loose consistency, formed bowel movement and days where he will go up to 3 days without a bowel movement.  He estimates about 2 days/month he requires taking a supplemental laxative such as magnesium citrate.  He has the sensation of lower abdominal pressure like I have to go all the time".  He feels like "there is a pork up there".  In the past she has taken Linzess but this resulted in diarrhea.  Trulance 3 mg daily was not helpful.  He believes he may be taking lubiprostone 8 mcg from 24 mcg daily.  However he will have to confirm the dose.  Labs on 2/14/2023 showed WBC 6.58, hemoglobin 12, MCV 91.6, platelets 134  His interval history is notable for hospitalization for congestive heart failure and renal insufficiency (records not available for review).

## 2023-03-21 ENCOUNTER — TELEPHONE ENCOUNTER (OUTPATIENT)
Dept: URBAN - METROPOLITAN AREA CLINIC 113 | Facility: CLINIC | Age: 88
End: 2023-03-21

## 2023-03-21 PROBLEM — 249517009: Status: ACTIVE | Noted: 2023-03-21

## 2023-03-21 PROBLEM — 266435005: Status: ACTIVE | Noted: 2023-03-21

## 2023-03-21 RX ORDER — LINACLOTIDE 72 UG/1
1 CAPSULE AT LEAST 30 MINUTES BEFORE THE FIRST MEAL OF THE DAY ON AN EMPTY STOMACH CAPSULE, GELATIN COATED ORAL ONCE A DAY
OUTPATIENT
Start: 2023-04-20 | End: 2023-05-10